# Patient Record
Sex: MALE | Race: WHITE | URBAN - METROPOLITAN AREA
[De-identification: names, ages, dates, MRNs, and addresses within clinical notes are randomized per-mention and may not be internally consistent; named-entity substitution may affect disease eponyms.]

---

## 2023-07-25 ENCOUNTER — WEB ENCOUNTER (OUTPATIENT)
Dept: URBAN - METROPOLITAN AREA CLINIC 107 | Facility: CLINIC | Age: 28
End: 2023-07-25

## 2023-07-25 ENCOUNTER — OFFICE VISIT (OUTPATIENT)
Dept: URBAN - METROPOLITAN AREA CLINIC 107 | Facility: CLINIC | Age: 28
End: 2023-07-25
Payer: OTHER GOVERNMENT

## 2023-07-25 VITALS
BODY MASS INDEX: 27.94 KG/M2 | HEIGHT: 67 IN | WEIGHT: 178 LBS | RESPIRATION RATE: 16 BRPM | HEART RATE: 54 BPM | DIASTOLIC BLOOD PRESSURE: 62 MMHG | TEMPERATURE: 96.5 F | SYSTOLIC BLOOD PRESSURE: 103 MMHG

## 2023-07-25 DIAGNOSIS — R19.7 ACUTE DIARRHEA: ICD-10-CM

## 2023-07-25 DIAGNOSIS — R10.84 GENERALIZED ABDOMINAL PAIN: ICD-10-CM

## 2023-07-25 PROCEDURE — 99244 OFF/OP CNSLTJ NEW/EST MOD 40: CPT | Performed by: INTERNAL MEDICINE

## 2023-07-25 PROCEDURE — 99204 OFFICE O/P NEW MOD 45 MIN: CPT | Performed by: INTERNAL MEDICINE

## 2023-07-25 RX ORDER — DICYCLOMINE HYDROCHLORIDE 20 MG/1
1 TABLET TABLET ORAL THREE TIMES A DAY
Qty: 90 TABLET | Refills: 3 | OUTPATIENT
Start: 2023-07-25 | End: 2023-11-21

## 2023-07-25 NOTE — HPI-TODAY'S VISIT:
28 y/o male presenting for evaluation of chronic diarrhea and abdominal pain. He was referred by an [facility] at [facility] ([facility] of name). He has had about 4 yrs of constant diarrhea. It happens multiple times per week. He also has abdominal pain as well. He has 1-2 times per day normally in the morning. No blood. He denies any nausea/emesis. He has been evaluated for celiac/thyroid disease and all labs normal. His CSC was normal last year. He denies any fclose amily hx of IBD.

## 2023-10-27 ENCOUNTER — OFFICE VISIT (OUTPATIENT)
Dept: URBAN - METROPOLITAN AREA CLINIC 107 | Facility: CLINIC | Age: 28
End: 2023-10-27
Payer: OTHER GOVERNMENT

## 2023-10-27 ENCOUNTER — TELEPHONE ENCOUNTER (OUTPATIENT)
Dept: URBAN - METROPOLITAN AREA CLINIC 113 | Facility: CLINIC | Age: 28
End: 2023-10-27

## 2023-10-27 VITALS
HEART RATE: 50 BPM | SYSTOLIC BLOOD PRESSURE: 108 MMHG | BODY MASS INDEX: 28.25 KG/M2 | WEIGHT: 180 LBS | HEIGHT: 67 IN | DIASTOLIC BLOOD PRESSURE: 62 MMHG | TEMPERATURE: 97.6 F | RESPIRATION RATE: 18 BRPM

## 2023-10-27 DIAGNOSIS — R10.84 GENERALIZED ABDOMINAL PAIN: ICD-10-CM

## 2023-10-27 DIAGNOSIS — K59.1 FUNCTIONAL DIARRHEA: ICD-10-CM

## 2023-10-27 DIAGNOSIS — K58.0 IRRITABLE BOWEL SYNDROME WITH DIARRHEA: ICD-10-CM

## 2023-10-27 PROBLEM — 197125005: Status: ACTIVE | Noted: 2023-10-27

## 2023-10-27 PROCEDURE — 99214 OFFICE O/P EST MOD 30 MIN: CPT

## 2023-10-27 RX ORDER — RIFAXIMIN 550 MG/1
1 TABLET TABLET ORAL THREE TIMES A DAY
Qty: 42 TABLET | Refills: 0 | OUTPATIENT
Start: 2023-10-27 | End: 2023-11-09

## 2023-10-27 RX ORDER — DICYCLOMINE HYDROCHLORIDE 20 MG/1
1 TABLET TABLET ORAL THREE TIMES A DAY
Qty: 90 TABLET | Refills: 3 | OUTPATIENT
End: 2023-10-27

## 2023-10-27 RX ORDER — DICYCLOMINE HYDROCHLORIDE 20 MG/1
1 TABLET TABLET ORAL THREE TIMES A DAY
Qty: 90 TABLET | Refills: 3 | Status: ACTIVE | COMMUNITY
Start: 2023-07-25 | End: 2023-11-21

## 2023-10-27 NOTE — HPI-TODAY'S VISIT:
28-year-old male presents for follow-up.  He was last seen on 7/25/2023 for chronic diarrhea.  He was provided dicyclomine and recommended Benefiber daily.  He has had no improvement with this diarrhea or abdominal pain after fiber and Dicyclomine. Dicyclomine lowers his blood pressure. He does have chronic low blood pressure." He ranges arpund 90/40 as his baseline. He first noticed the diarrhea after moving to [place] four years ago. Denies weight loss, fevers or chills. No nausea or vomiting. He does admit to excessive flatulence and gas. He also mentions he has tried and failed a low FODMAP diet in the past.  7/25/2023:  28 y/o male presenting for evaluation of chronic diarrhea and abdominal pain. He was referred by an MD at [facility] ([facility] name). He has had about 4 yrs of constant diarrhea. It happens multiple times per week. He also has abdominal pain as well. He has 1-2 times per day normally in the morning. No blood. He denies any nausea/emesis. He has been evaluated for celiac/thyroid disease and all labs normal. His CSC was normal last year. He denies any family hx of IBD.

## 2023-10-30 ENCOUNTER — TELEPHONE ENCOUNTER (OUTPATIENT)
Dept: URBAN - METROPOLITAN AREA CLINIC 113 | Facility: CLINIC | Age: 28
End: 2023-10-30

## 2023-11-06 ENCOUNTER — TELEPHONE ENCOUNTER (OUTPATIENT)
Dept: URBAN - METROPOLITAN AREA CLINIC 113 | Facility: CLINIC | Age: 28
End: 2023-11-06

## 2023-11-06 RX ORDER — RIFAXIMIN 550 MG/1
1 TABLET TABLET ORAL THREE TIMES A DAY
Qty: 42 TABLET | Refills: 0 | OUTPATIENT
Start: 2023-10-27 | End: 2023-11-21

## 2023-12-28 ENCOUNTER — OFFICE VISIT (OUTPATIENT)
Dept: URBAN - METROPOLITAN AREA CLINIC 107 | Facility: CLINIC | Age: 28
End: 2023-12-28
Payer: OTHER GOVERNMENT

## 2023-12-28 VITALS
TEMPERATURE: 98.2 F | SYSTOLIC BLOOD PRESSURE: 111 MMHG | HEIGHT: 67 IN | BODY MASS INDEX: 29.51 KG/M2 | DIASTOLIC BLOOD PRESSURE: 66 MMHG | WEIGHT: 188 LBS | HEART RATE: 71 BPM

## 2023-12-28 DIAGNOSIS — R10.84 GENERALIZED ABDOMINAL PAIN: ICD-10-CM

## 2023-12-28 DIAGNOSIS — K59.1 FUNCTIONAL DIARRHEA: ICD-10-CM

## 2023-12-28 DIAGNOSIS — K58.0 IRRITABLE BOWEL SYNDROME WITH DIARRHEA: ICD-10-CM

## 2023-12-28 PROCEDURE — 99214 OFFICE O/P EST MOD 30 MIN: CPT

## 2023-12-28 RX ORDER — COLESTIPOL HYDROCHLORIDE 1 G/1
1 TABLET TABLET, FILM COATED ORAL TWICE DAILY
Qty: 180 | Refills: 2 | OUTPATIENT
Start: 2023-12-28

## 2023-12-28 RX ORDER — RIFAXIMIN 550 MG/1
1 TABLET TABLET ORAL THREE TIMES A DAY
Qty: 42 TABLET | Refills: 0 | OUTPATIENT
Start: 2023-12-28 | End: 2024-01-11

## 2023-12-28 NOTE — HPI-TODAY'S VISIT:
28-year-old male presents for follow-up.  He was last seen on 10/27/2023.  He was tried on a course of Xifaxan and if no improvement we will try a bile acid sequestrant and obtain stool studies.  Xifaxan was approved for co-pay of $0 though patient wished it to be sent to his local pharmacy at [place].  He was informed they do not except fax prescriptions and he will need a handwritten prescription.  He states his symptoms had resolved on Xifaxan then immediately returned upon completing the course.   10/27/2023:  28-year-old male presents for follow-up.  He was last seen on 7/25/2023 for chronic diarrhea.  He was provided dicyclomine and recommended Benefiber daily.  He has had no improvement with this diarrhea or abdominal pain after fiber and Dicyclomine. Dicyclomine lowers his blood pressure. He does have chronic low blood pressure." He ranges around 90/40 as his baseline. He first noticed the diarrhea after moving to [place] four years ago. Denies weight loss, fevers or chills. No nausea or vomiting. He does admit to excessive flatulence and gas. He also mentions he has tried and failed a low FODMAP diet in the past.  7/25/2023:  28 y/o male presenting for evaluation of chronic diarrhea and abdominal pain. He was referred by an MD at [facility] (un[facility] of name). He has had about 4 yrs of constant diarrhea. It happens multiple times per week. He also has abdominal pain as well. He has 1-2 times per day normally in the morning. No blood. He denies any nausea/emesis. He has been evaluated for celiac/thyroid disease and all labs normal. His CSC was normal last year. He denies any family hx of IBD.

## 2023-12-28 NOTE — PHYSICAL EXAM CONSTITUTIONAL:
normal , pleasant, well nourished, in no acute distress Comments: Patient reports that he is 75% clear at this time.

## 2024-03-29 ENCOUNTER — OV EP (OUTPATIENT)
Dept: URBAN - METROPOLITAN AREA CLINIC 107 | Facility: CLINIC | Age: 29
End: 2024-03-29
Payer: OTHER GOVERNMENT

## 2024-03-29 VITALS
SYSTOLIC BLOOD PRESSURE: 112 MMHG | BODY MASS INDEX: 28.56 KG/M2 | DIASTOLIC BLOOD PRESSURE: 74 MMHG | HEIGHT: 67 IN | HEART RATE: 64 BPM | WEIGHT: 182 LBS | TEMPERATURE: 98.2 F

## 2024-03-29 DIAGNOSIS — K58.0 IRRITABLE BOWEL SYNDROME WITH DIARRHEA: ICD-10-CM

## 2024-03-29 DIAGNOSIS — K59.1 FUNCTIONAL DIARRHEA: ICD-10-CM

## 2024-03-29 DIAGNOSIS — R10.84 GENERALIZED ABDOMINAL PAIN: ICD-10-CM

## 2024-03-29 PROCEDURE — 99213 OFFICE O/P EST LOW 20 MIN: CPT

## 2024-03-29 RX ORDER — COLESTIPOL HYDROCHLORIDE 1 G/1
1 TABLET TABLET, FILM COATED ORAL TWICE DAILY
Qty: 180 | Refills: 2 | OUTPATIENT

## 2024-03-29 RX ORDER — RIFAXIMIN 550 MG/1
1 TABLET TABLET ORAL THREE TIMES A DAY
OUTPATIENT
Start: 2023-12-28

## 2024-03-29 RX ORDER — COLESTIPOL HYDROCHLORIDE 1 G/1
1 TABLET TABLET, FILM COATED ORAL TWICE DAILY
Qty: 180 | Refills: 2 | Status: ACTIVE | COMMUNITY
Start: 2023-12-28

## 2024-03-29 NOTE — HPI-TODAY'S VISIT:
28-year-old male presents for follow-up.  He was last seen on 12/20/2023.  His symptoms are markedly improved on Xifaxan however immediately recurred after completing course.  He was to try colestipol in the interim with consideration of repeat course of Xifaxan in about 2 months.  He had constipation when taking the Colestipol once daily. He swithed to every other day and this worked well for a week and a half then his dairrhea returned. He has had diarrhea ever since. He has not tried taking 2 tablets of colestipol every other day. He has the Xifaxan filled and ready to go if we wish to do another course.  12/20/2023:  28-year-old male presents for follow-up.  He was last seen on 10/27/2023.  He was tried on a course of Xifaxan and if no improvement we will try a bile acid sequestrant and obtain stool studies.  Xifaxan was approved for co-pay of $0 though patient wished it to be sent to his local pharmacy at [place].  He was informed they do not except fax prescriptions and he will need a handwritten prescription.  He states his symptoms had resolved on Xifaxan then immediately returned upon completing the course.   10/27/2023:  28-year-old male presents for follow-up.  He was last seen on 7/25/2023 for chronic diarrhea.  He was provided dicyclomine and recommended Benefiber daily.  He has had no improvement with this diarrhea or abdominal pain after fiber and Dicyclomine. Dicyclomine lowers his blood pressure. He does have chronic low blood pressure." He ranges around 90/40 as his baseline. He first noticed the diarrhea after moving to [place] four years ago. Denies weight loss, fevers or chills. No nausea or vomiting. He does admit to excessive flatulence and gas. He also mentions he has tried and failed a low FODMAP diet in the past.  7/25/2023:  28 y/o male presenting for evaluation of chronic diarrhea and abdominal pain. He was referred by an MD at [facility] (un[facility] of name). He has had about 4 yrs of constant diarrhea. It happens multiple times per week. He also has abdominal pain as well. He has 1-2 times per day normally in the morning. No blood. He denies any nausea/emesis. He has been evaluated for celiac/thyroid disease and all labs normal. His CSC was normal last year. He denies any family hx of IBD.

## 2024-06-28 ENCOUNTER — OFFICE VISIT (OUTPATIENT)
Dept: URBAN - METROPOLITAN AREA CLINIC 107 | Facility: CLINIC | Age: 29
End: 2024-06-28
Payer: OTHER GOVERNMENT

## 2024-06-28 ENCOUNTER — DASHBOARD ENCOUNTERS (OUTPATIENT)
Age: 29
End: 2024-06-28

## 2024-06-28 VITALS
RESPIRATION RATE: 18 BRPM | OXYGEN SATURATION: 98 % | TEMPERATURE: 97.5 F | SYSTOLIC BLOOD PRESSURE: 95 MMHG | BODY MASS INDEX: 29.13 KG/M2 | WEIGHT: 185.6 LBS | DIASTOLIC BLOOD PRESSURE: 64 MMHG | HEIGHT: 67 IN | HEART RATE: 70 BPM

## 2024-06-28 DIAGNOSIS — K59.1 FUNCTIONAL DIARRHEA: ICD-10-CM

## 2024-06-28 DIAGNOSIS — K58.0 IRRITABLE BOWEL SYNDROME WITH DIARRHEA: ICD-10-CM

## 2024-06-28 DIAGNOSIS — R10.84 GENERALIZED ABDOMINAL PAIN: ICD-10-CM

## 2024-06-28 PROCEDURE — 99214 OFFICE O/P EST MOD 30 MIN: CPT

## 2024-06-28 RX ORDER — RIFAXIMIN 550 MG/1
1 TABLET TABLET ORAL THREE TIMES A DAY
Status: ON HOLD | COMMUNITY
Start: 2023-12-28

## 2024-06-28 RX ORDER — AMITRIPTYLINE HYDROCHLORIDE 10 MG/1
1 TABLET AT BEDTIME TABLET, FILM COATED ORAL ONCE A DAY
Qty: 90 | Refills: 2 | OUTPATIENT
Start: 2024-06-28

## 2024-06-28 RX ORDER — COLESTIPOL HYDROCHLORIDE 1 G/1
1 TABLET TABLET, FILM COATED ORAL TWICE DAILY
Qty: 180 | Refills: 2 | Status: ON HOLD | COMMUNITY

## 2024-06-28 NOTE — HPI-TODAY'S VISIT:
28-year-old male presents for follow-up.  He was last seen in March.  He was plan for second course of Xifaxan pending recurrence of diarrhea.  He was to titrate his colestipol 1 tablet twice daily every other day as once daily because constipation and 1 tablet every other day was ineffective.  He states the second course of Xiafaxan was ineffective. He stopped Colestipol as it was ineffective as well. He feels his anxiety drives his symptoms. He is not able to see his PCP until August.  3/2024: 28-year-old male presents for follow-up.  He was last seen on 12/20/2023.  His symptoms are markedly improved on Xifaxan however immediately recurred after completing course.  He was to try colestipol in the interim with consideration of repeat course of Xifaxan in about 2 months.  He had constipation when taking the Colestipol once daily. He swithed to every other day and this worked well for a week and a half then his dairrhea returned. He has had diarrhea ever since. He has not tried taking 2 tablets of colestipol every other day. He has the Xifaxan filled and ready to go if we wish to do another course.  12/20/2023:  28-year-old male presents for follow-up.  He was last seen on 10/27/2023.  He was tried on a course of Xifaxan and if no improvement we will try a bile acid sequestrant and obtain stool studies.  Xifaxan was approved for co-pay of $0 though patient wished it to be sent to his local pharmacy at [place].  He was informed they do not except fax prescriptions and he will need a handwritten prescription.  He states his symptoms had resolved on Xifaxan then immediately returned upon completing the course.   10/27/2023:  28-year-old male presents for follow-up.  He was last seen on 7/25/2023 for chronic diarrhea.  He was provided dicyclomine and recommended Benefiber daily.  He has had no improvement with this diarrhea or abdominal pain after fiber and Dicyclomine. Dicyclomine lowers his blood pressure. He does have chronic low blood pressure." He ranges around 90/40 as his baseline. He first noticed the diarrhea after moving to [place] four years ago. Denies weight loss, fevers or chills. No nausea or vomiting. He does admit to excessive flatulence and gas. He also mentions he has tried and failed a low FODMAP diet in the past.  7/25/2023:  28 y/o male presenting for evaluation of chronic diarrhea and abdominal pain. He was referred by an MD at [facility] (un[facility] of name). He has had about 4 yrs of constant diarrhea. It happens multiple times per week. He also has abdominal pain as well. He has 1-2 times per day normally in the morning. No blood. He denies any nausea/emesis. He has been evaluated for celiac/thyroid disease and all labs normal. His CSC was normal last year. He denies any family hx of IBD.

## 2024-12-13 ENCOUNTER — OFFICE VISIT (OUTPATIENT)
Dept: URBAN - METROPOLITAN AREA CLINIC 107 | Facility: CLINIC | Age: 29
End: 2024-12-13
Payer: OTHER GOVERNMENT

## 2024-12-13 VITALS
SYSTOLIC BLOOD PRESSURE: 128 MMHG | TEMPERATURE: 97.9 F | HEART RATE: 69 BPM | DIASTOLIC BLOOD PRESSURE: 84 MMHG | OXYGEN SATURATION: 98 % | RESPIRATION RATE: 18 BRPM | HEIGHT: 67 IN | WEIGHT: 182.4 LBS | BODY MASS INDEX: 28.63 KG/M2

## 2024-12-13 DIAGNOSIS — K59.1 FUNCTIONAL DIARRHEA: ICD-10-CM

## 2024-12-13 DIAGNOSIS — K58.0 IRRITABLE BOWEL SYNDROME WITH DIARRHEA: ICD-10-CM

## 2024-12-13 DIAGNOSIS — R10.84 GENERALIZED ABDOMINAL PAIN: ICD-10-CM

## 2024-12-13 PROCEDURE — 99214 OFFICE O/P EST MOD 30 MIN: CPT

## 2024-12-13 RX ORDER — AMITRIPTYLINE HYDROCHLORIDE 10 MG/1
1 TABLET AT BEDTIME TABLET, FILM COATED ORAL ONCE A DAY
Qty: 90 | Refills: 2 | Status: DISCONTINUED | COMMUNITY
Start: 2024-06-28

## 2024-12-13 RX ORDER — COLESTIPOL HYDROCHLORIDE 1 G/1
1 TABLET TABLET, FILM COATED ORAL TWICE DAILY
Qty: 180 | Refills: 2 | Status: DISCONTINUED | COMMUNITY

## 2024-12-13 RX ORDER — RIFAXIMIN 550 MG/1
1 TABLET TABLET ORAL THREE TIMES A DAY
Status: DISCONTINUED | COMMUNITY
Start: 2023-12-28

## 2024-12-13 RX ORDER — ELUXADOLINE 100 MG/1
1 TABLET WITH FOOD TABLET, FILM COATED ORAL TWICE A DAY
Qty: 180 TABLET | Refills: 1 | OUTPATIENT
Start: 2024-12-13 | End: 2025-06-11

## 2024-12-13 NOTE — HPI-TODAY'S VISIT:
29 year old male presents for follow up. He was last seen in June. He was started on Amitriptyline. He states this caused dizziness and grogginess. It did not help his diarrhea.   6/2024: 28-year-old male presents for follow-up.  He was last seen in March.  He was plan for second course of Xifaxan pending recurrence of diarrhea.  He was to titrate his colestipol 1 tablet twice daily every other day as once daily because constipation and 1 tablet every other day was ineffective.  He states the second course of Xiafaxan was ineffective. He stopped Colestipol as it was ineffective as well. He feels his anxiety drives his symptoms. He is not able to see his PCP until August.  3/2024: 28-year-old male presents for follow-up.  He was last seen on 12/20/2023.  His symptoms are markedly improved on Xifaxan however immediately recurred after completing course.  He was to try colestipol in the interim with consideration of repeat course of Xifaxan in about 2 months.  He had constipation when taking the Colestipol once daily. He swithed to every other day and this worked well for a week and a half then his dairrhea returned. He has had diarrhea ever since. He has not tried taking 2 tablets of colestipol every other day. He has the Xifaxan filled and ready to go if we wish to do another course.  12/20/2023:  28-year-old male presents for follow-up.  He was last seen on 10/27/2023.  He was tried on a course of Xifaxan and if no improvement we will try a bile acid sequestrant and obtain stool studies.  Xifaxan was approved for co-pay of $0 though patient wished it to be sent to his local pharmacy at [place].  He was informed they do not except fax prescriptions and he will need a handwritten prescription.  He states his symptoms had resolved on Xifaxan then immediately returned upon completing the course.   10/27/2023:  28-year-old male presents for follow-up.  He was last seen on 7/25/2023 for chronic diarrhea.  He was provided dicyclomine and recommended Benefiber daily.  He has had no improvement with this diarrhea or abdominal pain after fiber and Dicyclomine. Dicyclomine lowers his blood pressure. He does have chronic low blood pressure." He ranges around 90/40 as his baseline. He first noticed the diarrhea after moving to [place] four years ago. Denies weight loss, fevers or chills. No nausea or vomiting. He does admit to excessive flatulence and gas. He also mentions he has tried and failed a low FODMAP diet in the past.  7/25/2023:  28 y/o male presenting for evaluation of chronic diarrhea and abdominal pain. He was [facility] by [facility] ([facility] name). He has had about 4 yrs of constant diarrhea. It happens multiple times per week. He also has abdominal pain as well. He has 1-2 times per day normally in the morning. No blood. He denies any nausea/emesis. He has been evaluated for celiac/thyroid disease and all labs normal. His CSC was normal last year. He denies any family hx of IBD.